# Patient Record
Sex: FEMALE | Race: WHITE | Employment: UNEMPLOYED
[De-identification: names, ages, dates, MRNs, and addresses within clinical notes are randomized per-mention and may not be internally consistent; named-entity substitution may affect disease eponyms.]

---

## 2017-09-17 ENCOUNTER — HEALTH MAINTENANCE LETTER (OUTPATIENT)
Age: 62
End: 2017-09-17

## 2018-01-21 ENCOUNTER — APPOINTMENT (OUTPATIENT)
Dept: CT IMAGING | Facility: CLINIC | Age: 63
End: 2018-01-21
Attending: EMERGENCY MEDICINE
Payer: MEDICAID

## 2018-01-21 ENCOUNTER — HOSPITAL ENCOUNTER (EMERGENCY)
Facility: CLINIC | Age: 63
Discharge: HOME | End: 2018-01-21
Attending: EMERGENCY MEDICINE | Admitting: EMERGENCY MEDICINE
Payer: MEDICAID

## 2018-01-21 VITALS
OXYGEN SATURATION: 98 % | HEART RATE: 92 BPM | SYSTOLIC BLOOD PRESSURE: 106 MMHG | DIASTOLIC BLOOD PRESSURE: 72 MMHG | TEMPERATURE: 96.7 F | RESPIRATION RATE: 16 BRPM

## 2018-01-21 DIAGNOSIS — F10.920 ALCOHOLIC INTOXICATION WITHOUT COMPLICATION (H): ICD-10-CM

## 2018-01-21 LAB — ALCOHOL BREATH TEST: 0.23 (ref 0–0.01)

## 2018-01-21 PROCEDURE — 99284 EMERGENCY DEPT VISIT MOD MDM: CPT | Mod: 25

## 2018-01-21 PROCEDURE — 70450 CT HEAD/BRAIN W/O DYE: CPT

## 2018-01-21 ASSESSMENT — ENCOUNTER SYMPTOMS
CONFUSION: 0
WEAKNESS: 0
FEVER: 0
VOMITING: 0
SHORTNESS OF BREATH: 0
ABDOMINAL PAIN: 0
HEADACHES: 0

## 2018-01-21 NOTE — ED AVS SNAPSHOT
[facility]    [address] [place] [address]    Phone:  [phone]    Fax:  [phone]                                       Tamika Vargas   MRN: [id number]    Department:  [facility]   Date of Visit:  1/21/2018           Patient Information     Date Of Birth          1955        Your diagnoses for this visit were:     Alcoholic intoxication without complication (H)        You were seen by Kristal Reilly MD and Clemente Hart MD.      Follow-up Information     Call Post, ELIJAH Abernathy MD.    Specialty:  Family Practice    Why:  As needed    Contact information:    [address] [facility] [address] [phone]          Discharge Instructions         Alcohol Intoxication  Alcohol intoxication occurs when you drink alcohol faster than your liver can remove it from your system. The following facts are important to remember:    It can take 10 minutes or more to start to feel the effects of a drink, so you can easily get more intoxicated than you intended.    One drink may be more than 1 serving of alcohol. Depending on the drink, it can be 2 to 4 servings.    It takes about an hour for your body to metabolize (clear) 1 serving. If you have more than 1 drink, it can take a couple of hours or more.    Many things affect how drinks will affect you, including whether you ve eaten, how fast you drink, your size, how much you normally drink (or not), medicines you take, chronic diseases you have, and gender.  Signs and symptoms of alcohol poisoning  The following are signs and symptoms of alcohol poisoning:  Mild impairment    Reduced inhibitions    Slurred speech    Drowsiness    Decreased fine motor skills  Moderate impairment    Erratic behavior, aggression, depression    Impaired judgment    Confusion    Concentration difficulties    Coordination problems  Severe  "impairment    Vomiting    Seizures    Unconsciousness    Cold, clammy    Slow or irregular breathing    Hypothermia (low body temperature)    Coma  Health effects  Alcohol abuse causes health problems. Sometimes this can happen after only drinking a  little.\" There is no set number of drinks or amount of alcohol that defines too much. The more you drink at one time, and the more frequently you drink determine both the short-term and long-term health effects. It affects all parts of your body and your health, including your:    Brain. Alcohol is a central nervous system depressant. It can damage parts of the brain that affect your balance, memory, thinking, and emotions. It can cause memory loss, blackouts, depression, agitation, sleep cycle changes, and seizures. These changes may or may not be reversible.    Heart and vascular system. Alcohol affects multiple areas. It can damage heart muscle causing cardiomyopathy, which is a weakening and stretching of the heart muscle. This can lead to trouble breathing, an irregular heartbeat, atrial fibrillation, leg swelling, and heart failure. It makes the blood vessels stiffen causing hypertension (high blood pressure). All of these problems increase your risk of having heart attacks or strokes.    Liver. Alcohol causes fat to build up in the liver, affecting its normal function. This increases the risk for hepatitis, leading to abdominal pain, appetite loss, jaundice, bleeding problems, liver fibrosis, and cirrhosis. This in turn can affect your ability to fight off infections, and can cause diabetes. The liver changes prevent it from removing toxins in your blood that can cause encephalopathy. Signs of this are confusion, altered level of consciousness, personality changes, memory loss, seizures, coma, and death.    Pancreas. Alcohol can cause inflammation of the pancreas, or pancreatitis. This can cause pain in your abdomen, fever, and diabetes.    Immune system. Alcohol " weakens your immune system in a number of ways. It suppresses your immune system making it harder to fight off infections and colds. You will also have a higher risk of certain infections like pneumonia and tuberculosis.    Cancer risk. Alcohol raises your risk of cancer of the mouth, esophagus, pharynx, larynx, liver, and breast.    Sexual function. Alcohol abuse can also lead to sexual problems.  Alcohol use during pregnancy may cause permanent damage to the growing baby.  Home care  The following guidelines will help you care for yourself at home:    Don't drink any more alcohol.    Don't drive until all effects of the alcohol have worn off.    Don't operate machinery that can cause injuries.    Get lots of rest over the next few days. Drink plenty of water and other non-alcoholic liquids. Try to eat regular meals.    If you have been drinking heavily on a daily basis, you may go through alcohol withdrawal. The usual symptoms last 3 to 4 days and may include nervousness, shakiness, nausea, sweating, sleeplessness, and can even cause seizures and a serious withdrawal symptom called delirium tremens, or DTs. During this time, it is best that you stay with family or friends who can help and support you. You can also admit yourself to a residential detox program. If your symptoms are severe (seizures, severe shakiness, confusion), contact your doctor or call an ambulance for help (see below).   Follow-up care  If alcohol is a problem in your life, these are some organizations that can help you:    Alcoholics Anonymous offers support through a self-help fellowship. There are no dues or fees. See the Yellow Pages and call for time and place of meetings. Find AA online at www.aa.org.    [employer] offers support to families of alcohol users. Contact 710-752-1166, or online at www.al-anoheather.org.    National [place] on Alcoholism and Drug Dependence can be reached at 797-239-9731, or online at www.ncadd.org.    There are also  inpatient and residential alcohol detox programs. Check the Internet or phonebook Yellow Pages under  Drug Abuse and Treatment Centers.   Call 911  Call 911 if any of these occur:    Trouble breathing or slow irregular breathing    Chest pain    Sudden weakness on one side of your body or sudden trouble speaking    Heavy bleeding or vomiting blood    Very drowsy or trouble awakening    Fainting or loss of consciousness    Rapid heart rate    Seizure  When to seek medical advice  Call your healthcare provider right away if any of these occur:    Severe shakiness     Fever over 100.4  F (38.0  C)    Confusion or hallucinations (seeing, hearing, or feeling things that are not there)    Pain in your upper abdomen that gets worse    Repeated vomiting  Date Last Reviewed: 6/1/2016 2000-2017 [employer]. [address] [place] [address], [place] [address]. All rights reserved. This information is not intended as a substitute for professional medical care. Always follow your healthcare professional's instructions.          24 Hour Appointment Hotline       To make an appointment at any [facility], call [phone] ([phone]). If you don't have a family doctor or clinic, we will help you find one. [facility] clinics are conveniently located to serve the needs of you and your family.             Review of your medicines      Our records show that you are taking the medicines listed below. If these are incorrect, please call your family doctor or clinic.        Dose / Directions Last dose taken    IBUPROFEN PO   Dose:  400 mg        Take 400 mg by mouth   Refills:  0                Procedures and tests performed during your visit     Alcohol breath test POCT    Head CT w/o contrast      Orders Needing Specimen Collection     None      Pending Results     No orders found from 1/19/2018 to 1/22/2018.            Pending Culture Results     No orders found from 1/19/2018 to 1/22/2018.            Pending Results  Instructions     If you had any lab results that were not finalized at the time of your Discharge, you can call the ED Lab Result RN at [phone]. You will be contacted by this team for any positive Lab results or changes in treatment. The nurses are available 7 days a week from 10A to 6:30P.  You can leave a message 24 hours per day and they will return your call.        Test Results From Your Hospital Stay        1/21/2018  2:51 AM      Component Results     Component Value Ref Range & Units Status    Alcohol Breath Test 0.23 (A) 0.00 - 0.01 Final         1/21/2018  6:18 AM      Narrative     CT SCAN OF THE HEAD WITHOUT CONTRAST   1/21/2018 3:15 AM     HISTORY: Intoxicated, found outside on the ground, evaluate for bleed.      TECHNIQUE:  Axial images of the head and coronal reformations without  IV contrast material. Radiation dose for this scan was reduced using  automated exposure control, adjustment of the mA and/or kV according  to patient size, or iterative reconstruction technique.    COMPARISON: None.    FINDINGS:  The ventricles are normal in size, shape and configuration.   The brain parenchyma and subarachnoid spaces are normal. There is no  evidence of intracranial hemorrhage, mass, acute infarct or anomaly.     The visualized portions of the sinuses and mastoids appear normal.  There is no evidence of trauma.        Impression     IMPRESSION: No acute abnormality.      ANIKA ZHOU MD                Clinical Quality Measure: Blood Pressure Screening     Your blood pressure was checked while you were in the emergency department today. The last reading we obtained was  BP: 106/72 . Please read the guidelines below about what these numbers mean and what you should do about them.  If your systolic blood pressure (the top number) is less than 120 and your diastolic blood pressure (the bottom number) is less than 80, then your blood pressure is normal. There is nothing more that you need to do about  "it.  If your systolic blood pressure (the top number) is 120-139 or your diastolic blood pressure (the bottom number) is 80-89, your blood pressure may be higher than it should be. You should have your blood pressure rechecked within a year by a primary care provider.  If your systolic blood pressure (the top number) is 140 or greater or your diastolic blood pressure (the bottom number) is 90 or greater, you may have high blood pressure. High blood pressure is treatable, but if left untreated over time it can put you at risk for heart attack, stroke, or kidney failure. You should have your blood pressure rechecked by a primary care provider within the next 4 weeks.  If your provider in the emergency department today gave you specific instructions to follow-up with your doctor or provider even sooner than that, you should follow that instruction and not wait for up to 4 weeks for your follow-up visit.        Thank you for choosing [facility]       Thank you for choosing [facility] for your care. Our goal is always to provide you with excellent care. Hearing back from our patients is one way we can continue to improve our services. Please take a few minutes to complete the written survey that you may receive in the mail after you visit with us. Thank you!        [employer] Information     [employer] lets you send messages to your doctor, view your test results, renew your prescriptions, schedule appointments and more. To sign up, go to [email or web address] . Click on \"Log in\" on the left side of the screen, which will take you to the Welcome page. Then click on \"Sign up Now\" on the right side of the page.     You will be asked to enter the access code listed below, as well as some personal information. Please follow the directions to create your username and password.     Your access code is: [email or web address]  Expires: 2018  8:00 AM     Your access code will  in 90 days. If you need help or a new code, please call " your [facility] clinic or [phone].        Care EveryWhere ID     This is your Care EveryWhere ID. This could be used by other organizations to access your [facility] medical records  [id number]        Equal Access to Services     VADIM GALICIA: David Marley, wachristinada luqadaha, qaybta kaalmada lior, asim galicia. So [facility] [phone].    ATENCIÓN: Si habla español, tiene a hernandez disposición servicios gratuitos de asistencia lingüística. Llame al [phone].    We comply with applicable federal civil rights laws and [place] laws. We do not discriminate on the basis of race, color, national origin, age, disability, sex, sexual orientation, or gender identity.            After Visit Summary       This is your record. Keep this with you and show to your community pharmacist(s) and doctor(s) at your next visit.

## 2018-01-21 NOTE — DISCHARGE INSTRUCTIONS
"  Alcohol Intoxication  Alcohol intoxication occurs when you drink alcohol faster than your liver can remove it from your system. The following facts are important to remember:    It can take 10 minutes or more to start to feel the effects of a drink, so you can easily get more intoxicated than you intended.    One drink may be more than 1 serving of alcohol. Depending on the drink, it can be 2 to 4 servings.    It takes about an hour for your body to metabolize (clear) 1 serving. If you have more than 1 drink, it can take a couple of hours or more.    Many things affect how drinks will affect you, including whether you ve eaten, how fast you drink, your size, how much you normally drink (or not), medicines you take, chronic diseases you have, and gender.  Signs and symptoms of alcohol poisoning  The following are signs and symptoms of alcohol poisoning:  Mild impairment    Reduced inhibitions    Slurred speech    Drowsiness    Decreased fine motor skills  Moderate impairment    Erratic behavior, aggression, depression    Impaired judgment    Confusion    Concentration difficulties    Coordination problems  Severe impairment    Vomiting    Seizures    Unconsciousness    Cold, clammy    Slow or irregular breathing    Hypothermia (low body temperature)    Coma  Health effects  Alcohol abuse causes health problems. Sometimes this can happen after only drinking a  little.\" There is no set number of drinks or amount of alcohol that defines too much. The more you drink at one time, and the more frequently you drink determine both the short-term and long-term health effects. It affects all parts of your body and your health, including your:    Brain. Alcohol is a central nervous system depressant. It can damage parts of the brain that affect your balance, memory, thinking, and emotions. It can cause memory loss, blackouts, depression, agitation, sleep cycle changes, and seizures. These changes may or may not be " reversible.    Heart and vascular system. Alcohol affects multiple areas. It can damage heart muscle causing cardiomyopathy, which is a weakening and stretching of the heart muscle. This can lead to trouble breathing, an irregular heartbeat, atrial fibrillation, leg swelling, and heart failure. It makes the blood vessels stiffen causing hypertension (high blood pressure). All of these problems increase your risk of having heart attacks or strokes.    Liver. Alcohol causes fat to build up in the liver, affecting its normal function. This increases the risk for hepatitis, leading to abdominal pain, appetite loss, jaundice, bleeding problems, liver fibrosis, and cirrhosis. This in turn can affect your ability to fight off infections, and can cause diabetes. The liver changes prevent it from removing toxins in your blood that can cause encephalopathy. Signs of this are confusion, altered level of consciousness, personality changes, memory loss, seizures, coma, and death.    Pancreas. Alcohol can cause inflammation of the pancreas, or pancreatitis. This can cause pain in your abdomen, fever, and diabetes.    Immune system. Alcohol weakens your immune system in a number of ways. It suppresses your immune system making it harder to fight off infections and colds. You will also have a higher risk of certain infections like pneumonia and tuberculosis.    Cancer risk. Alcohol raises your risk of cancer of the mouth, esophagus, pharynx, larynx, liver, and breast.    Sexual function. Alcohol abuse can also lead to sexual problems.  Alcohol use during pregnancy may cause permanent damage to the growing baby.  Home care  The following guidelines will help you care for yourself at home:    Don't drink any more alcohol.    Don't drive until all effects of the alcohol have worn off.    Don't operate machinery that can cause injuries.    Get lots of rest over the next few days. Drink plenty of water and other non-alcoholic liquids.  Try to eat regular meals.    If you have been drinking heavily on a daily basis, you may go through alcohol withdrawal. The usual symptoms last 3 to 4 days and may include nervousness, shakiness, nausea, sweating, sleeplessness, and can even cause seizures and a serious withdrawal symptom called delirium tremens, or DTs. During this time, it is best that you stay with family or friends who can help and support you. You can also admit yourself to a residential detox program. If your symptoms are severe (seizures, severe shakiness, confusion), contact your doctor or call an ambulance for help (see below).   Follow-up care  If alcohol is a problem in your life, these are some organizations that can help you:    Alcoholics Anonymous offers support through a self-help fellowship. There are no dues or fees. See the Yellow Pages and call for time and place of meetings. Find AA online at www.aa.org.    [employer] offers support to families of alcohol users. Contact 548-171-3834, or online at www.al-anoheather.org.    National [place] on Alcoholism and Drug Dependence can be reached at 228-851-6868, or online at www.ncadd.org.    There are also inpatient and residential alcohol detox programs. Check the Internet or phonebook Yellow Pages under  Drug Abuse and Treatment Centers.   Call 911  Call 911 if any of these occur:    Trouble breathing or slow irregular breathing    Chest pain    Sudden weakness on one side of your body or sudden trouble speaking    Heavy bleeding or vomiting blood    Very drowsy or trouble awakening    Fainting or loss of consciousness    Rapid heart rate    Seizure  When to seek medical advice  Call your healthcare provider right away if any of these occur:    Severe shakiness     Fever over 100.4  F (38.0  C)    Confusion or hallucinations (seeing, hearing, or feeling things that are not there)    Pain in your upper abdomen that gets worse    Repeated vomiting  Date Last Reviewed: 6/1/2016 2000-2017 [employer]. [address] [place] [address], [place] [address]. All rights reserved. This information is not intended as a substitute for professional medical care. Always follow your healthcare professional's instructions.

## 2018-01-21 NOTE — ED NOTES
Pt daughter called 911 after being unable to get ahold of pt and concerns of ETOH abuse. PD arrived to find pt laying on the ground, in the snow outside. She stated she had been drinking and decided to just lay outside for fresh air. Denies injury, skin warm and dry with no obvious injuries.

## 2018-01-21 NOTE — ED AVS SNAPSHOT
[facility] Emergency Department    [address]    [facility] [address]    Phone:  [phone]    Fax:  [phone]                                       Tamika Vargas   MRN: [id number]    Department:  [facility] Emergency Department   Date of Visit:  1/21/2018           After Visit Summary Signature Page     I have received my discharge instructions, and my questions have been answered. I have discussed any challenges I see with this plan with the nurse or doctor.    ..........................................................................................................................................  Patient/Patient Representative Signature      ..........................................................................................................................................  Patient Representative Print Name and Relationship to Patient    ..................................................               ................................................  Date                                            Time    ..........................................................................................................................................  Reviewed by Signature/Title    ...................................................              ..............................................  Date                                                            Time

## 2018-07-04 ENCOUNTER — APPOINTMENT (OUTPATIENT)
Dept: GENERAL RADIOLOGY | Facility: CLINIC | Age: 63
End: 2018-07-04
Attending: EMERGENCY MEDICINE
Payer: COMMERCIAL

## 2018-07-04 ENCOUNTER — HOSPITAL ENCOUNTER (EMERGENCY)
Facility: CLINIC | Age: 63
Discharge: HOME | End: 2018-07-05
Attending: EMERGENCY MEDICINE | Admitting: EMERGENCY MEDICINE
Payer: COMMERCIAL

## 2018-07-04 VITALS
DIASTOLIC BLOOD PRESSURE: 64 MMHG | OXYGEN SATURATION: 99 % | HEART RATE: 78 BPM | SYSTOLIC BLOOD PRESSURE: 125 MMHG | RESPIRATION RATE: 16 BRPM | TEMPERATURE: 98.5 F | HEIGHT: 61 IN

## 2018-07-04 DIAGNOSIS — F10.920 ALCOHOLIC INTOXICATION WITHOUT COMPLICATION (H): ICD-10-CM

## 2018-07-04 DIAGNOSIS — S89.91XA KNEE INJURY, RIGHT, INITIAL ENCOUNTER: ICD-10-CM

## 2018-07-04 LAB — ALCOHOL BREATH TEST: 0.3 (ref 0–0.01)

## 2018-07-04 PROCEDURE — 73562 X-RAY EXAM OF KNEE 3: CPT | Mod: RT

## 2018-07-04 PROCEDURE — 82075 ASSAY OF BREATH ETHANOL: CPT

## 2018-07-04 PROCEDURE — 99284 EMERGENCY DEPT VISIT MOD MDM: CPT

## 2018-07-04 ASSESSMENT — ENCOUNTER SYMPTOMS: WOUND: 1

## 2018-07-04 NOTE — ED AVS SNAPSHOT
[facility] Emergency Department    [address]    [facility] [address]    Phone:  [phone]    Fax:  [phone]                                       Tamika Vargas   MRN: [id number]    Department:  [facility] Emergency Department   Date of Visit:  7/4/2018           After Visit Summary Signature Page     I have received my discharge instructions, and my questions have been answered. I have discussed any challenges I see with this plan with the nurse or doctor.    ..........................................................................................................................................  Patient/Patient Representative Signature      ..........................................................................................................................................  Patient Representative Print Name and Relationship to Patient    ..................................................               ................................................  Date                                            Time    ..........................................................................................................................................  Reviewed by Signature/Title    ...................................................              ..............................................  Date                                                            Time

## 2018-07-04 NOTE — ED NOTES
Bed: ED04  Expected date: 7/4/18  Expected time: 6:46 PM  Means of arrival: Ambulance  Comments:  BV2

## 2018-07-05 NOTE — ED PROVIDER NOTES
"  History     Chief Complaint:  Alcohol Intoxication    HPI   Tamika Vargas is a 63 year old female who presents to the emergency department today for evaluation of alcohol intoxication. The patient reports she was walking in the park by herself and tripped and hurt her right knee. She does not think she hit her head or lost consciousness. She denies headache, neck pain, back pain, chest pain, shortness of breath, abdominal pain, left leg pain, arm pain, suicidal thoughts, or thoughts of hurting others. The patient admits to drinking alcohol today, and says she drinks alcohol everyday. She denies other concerns/complaints at this time.     Allergies:  No Known Allergies     Medications:    Ibuprofen    Past Medical History:    Substance abuse    Past Surgical History:     section  Cholecystectomy    Family History:    Mother: heart disease, diabetes, alzheimer disease  Father: heart disease, diabetes  Maternal grandmother: diabetes  Maternal grandfather: diabetes  Paternal grandmother: diabetes  Paternal grandfather: diabetes  Brother: diabetes   Sister: diabetes  Daughter: diabetes    Social History:  Smoking Status: Current Every Day Smoker   Packs/day: 0.5   Type: cigaretts  Smokeless Tobacco: Never Used  Alcohol Use: Positive   Marital Status:  Single      Review of Systems   Constitutional:        Alcohol intoxication   Skin: Positive for wound.   Neurological: Negative for syncope.   Psychiatric/Behavioral: Negative for self-injury and suicidal ideas.   All other systems reviewed and are negative.    Physical Exam     Patient Vitals for the past 24 hrs:   BP Temp Temp src Pulse Heart Rate Resp SpO2 Height   18 2347 125/64 - - 78 - 16 99 % -   18 2210 - - - - - - 96 % -   18 2209 123/78 - - - - - - -   18 1857 117/74 98.5  F (36.9  C) Temporal - 89 20 95 % 1.549 m (5' 1\")        Physical Exam  Constitutional: The patient is oriented to person, place, and time. Alert and " cooperative.  HENT:   Head: atraumatic.   Right Ear: External ear normal. TM normal appearing.   Left Ear: External ear normal. TM normal appearing.   Nose: Nose normal.   Mouth/Throat: Uvula is midline, oropharynx is clear and moist and mucous membranes are normal. No posterior oropharyngeal edema or erythema.   Eyes: Conjunctivae, EOM and lids are normal. Pupils are equal, round, and reactive to light.   Neck: Trachea normal. Normal range of motion. Neck supple.   Cardiovascular: Normal rate, regular rhythm, normal heart sounds, and intact distal pulses.    Pulmonary/Chest: Effort normal and breath sounds equal bilaterally. No crackles or wheezing.   Abdominal: Soft. No tenderness. No rebound and no guarding.   Musculoskeletal: No midline tenderness, step-offs, or deformities in the C/T/L spine.   RLE: no obvious deformity, abrasion over the knee. Tenderness to palpation over the knee. Non-tender to palpation over the greater troch, femur, lower leg, ankle, and foot. Normal ROM at the hip, knee, ankle, and foot. Sensation intact to light touch throughout. 2+ DP and PT pulse.  Neurological: Alert and Oriented. Strength 5/5 in upper and lower extremities bilaterally. Sensation intact to light touch throughout.  Skin: Skin is dry. No rash noted.        Emergency Department Course     Imaging:  Radiology findings were communicated with the patient who voiced understanding of the findings.    XR Knee Right 3 Views   Tiny knee joint effusion. Otherwise negative right knee  x-rays.  Reading per radiology    Laboratory:  Laboratory findings were communicated with the patient who voiced understanding of the findings.    Alcohol breath test POCT: 0.299 (A)    Emergency Department Course:    1909 Nursing notes and vitals reviewed.    1915 I performed an exam of the patient as documented above.     1949 The patient was sent for a knee x-ray while in the emergency department, results above.      2122 The patient underwent a  breathalyzer test here in the emergency department, findings above.     Patient signed out to the oncoming physician, Dr. Jay awaiting discharge with sober ride vs discharge to detox.    Impression & Plan      Medical Decision Making:  Tamika Vargas is a 63 year old female who presents to the emergency department today for evaluation of acute alcohol intoxication and traumatic right knee pain.  Upon presentation in the ED, patient is nontoxic-appearing and vitals are within normal limits and stable.  On exam, she is well-appearing.  She is alert, oriented, neurologic exam is nonfocal.  Head is atraumatic and without signs of basilar skull fracture.  She has no midline tenderness, step-offs, or deformities in the C/T/L-spine.  Cardiopulmonary exam is unremarkable.  Abdomen is soft nontender throughout.  On exam of the right lower extremity, there is no obvious deformity.  There is an abrasion over the knee.  She does endorse tenderness palpation over the knee.  She has good range of motion of the right lower extremity without significant pain.  She is neurovascularly intact.  The rest of her exam is as mentioned above.    Breathalyzer is elevated 0.299.  X-ray of the right knee was obtained and demonstrates a tiny knee joint effusion.  There is no other acute abnormality noted.  These results were discussed with the patient she notes understanding.  Her head to toe trauma exam is otherwise unremarkable.  The patient denies any other specific complaints at this time.  The patient has no findings on exam to suggest alcohol withdrawal.  There is no indication for evaluation by DEC at this time. She denies SI/HI. Given that the patient is well-appearing, I do feel she is safe for discharge.  However, given that she is acutely intoxicated, I did discuss with the patient that she will need to find either a sober ride to home or she will be discharged to detox.  The patient was given a trial of ambulation and  tolerated this well without difficulty.  She does have a steady gait.  The patient notes that she will attempt to obtain a sober ride home.  The patient was then signed out to my colleague, Dr. Jay, pending discharge with sober ride versus discharge to detox.  She was stable/improved at time of signout.    Diagnosis:    ICD-10-CM    1. Alcoholic intoxication without complication (H) F10.920    2. Knee injury, right, initial encounter S89.91XA      Disposition:   Signed out to Dr. Jay    Scribe Disclosure:  I, Moriah Iraheta, am serving as a scribe at 7:10 PM on 7/4/2018 to document services personally performed by Karen Fitzgerald MD based on my observations and the provider's statements to me.     [facility] EMERGENCY DEPARTMENT       Karen Fitzgerald MD  07/05/18 0023

## 2018-07-05 NOTE — ED TRIAGE NOTES
Patient states she was walking in the park and tripped injurying her right knee.  Patient called 911.  Small abrasion noted to right knee.  Patient smells strongly of alcohol and blew a 0.3 for EMS.  Patient alert and oriented x3.  Airway, breathing and circulation intact.

## 2018-07-05 NOTE — DISCHARGE INSTRUCTIONS
Please follow up closely with your regular physician. Please return to the ED if your symptoms worsen or if you develop new or concerning symptoms.     Discharge Instructions  Alcohol Intoxication    You have been seen today with alcohol intoxication. This means that you have enough alcohol in your system to impair your ability to mentally and physically function. When you are intoxicated, we are not allowed to release you without a sober adult to be with you. You may not drive, operate dangerous equipment, or do anything else dangerous until you are sober.    You may have come to the Emergency Department because of your intoxication, or for another reason, such as because of an injury. No matter what the case is, this visit is a  red flag  regarding alcohol use, and you should consider whether your drinking pattern is a problem for you.     You may be at risk for alcohol-related problems if:      Men: you drink more than 14 drinks per week, or more than 4 drinks per occasion.      Women: you drink more than 7 drinks per week or more than 3 drinks per occasion.      You have black-outs.    You do things you regret while drinking.    You have legal problems because of drinking (DUI).    You have job problems because of drinking (you call in sick to work because of drinking).    CAGE Questions    Have you ever felt you should cut down on your drinking?    Have people annoyed you by criticizing your drinking?    Have you ever felt bad or guilty about your drinking?    Have you ever had a drink first thing in the morning to steady your nerves or get rid of a hangover (eye opener)?    If you answer yes to any of the CAGE questions, you may have a problem with alcohol.      Return to the Emergency Department if:    You become shaky or tremble when you try to stop drinking.      You have a seizure or pass out.      You throw up (vomit) blood. This may be bright red or it may look like black coffee grounds.      You have  blood in the stool. This may be bright red or appear as a black, tarry, bad smelling stool.      You become lightheaded or faint.      For further help, contact:     Your caregiver.      Alcoholics Anonymous (AA).      A drug or alcohol rehabilitation program.      You can get information on alcohol resources and groups by calling the number 211 or 1-946.728.3653 on any phone.       Seek medical care if:    You have persistent vomiting.      You have persistent pain in any part of your body.      You do not feel better after a few days.    If you were given a prescription for medicine here today, be sure to read all of the information (including the package insert) that comes with your prescription.  This will include important information about the medicine, its side effects, and any warnings that you need to know about.  The pharmacist who fills the prescription can provide more information and answer questions you may have about the medicine.  If you have questions or concerns that the pharmacist cannot address, please call or return to the Emergency Department.   Remember that you can always come back to the Emergency Department if you are not able to see your regular doctor in the amount of time listed above, if you get any new symptoms, or if there is anything that worries you.

## 2018-07-05 NOTE — ED NOTES
Pt reports she tripped and fell in the park today and called 911. Pt denies head strike, admits to drinking today

## 2021-10-28 NOTE — ED PROVIDER NOTES
History     Chief Complaint:  Alcohol Intoxication      HPI   Tamika Vargas is a 62 year old female who presents with alcohol intoxication.  Patient states she walked to the liquor store and bought a bottle of vodka.  When she walked back home the door was locked and she couldn't get in (patient didn't have a key).  Patient states she sat on the doorstep outside waiting for someone to let her in and drank more than a 1/5 of vodka.  She denies falling or head trauma.  The next thing she remembers is waking up to police shaking her.  She was brought here for evaluation.      I spoke with the patient's daughter Jennifer over the phone.  She is a nurse at [employer].  She could not find her mom this evening went she went out to work and notified the police.  Police found her laying on the ground outside the house and woke up up and brought her here.     Allergies:  No known drug allergies     Medications:    Ibuprofen      Past Medical History:    Alcoholism   Anxiety  Moderate major depression  Osteoarthritis of hand    Past Surgical History:      Cholecystectomy  Endometrial surgery     Family History:    Heart disease, diabetes    Social History:  Smoking Status: current Smoker  Alcohol Use: Yes.   Marital Status:  Single      Review of Systems   Constitutional: Negative for fever.   Respiratory: Negative for shortness of breath.    Cardiovascular: Negative for chest pain.   Gastrointestinal: Negative for abdominal pain and vomiting.   Neurological: Negative for weakness and headaches.   Psychiatric/Behavioral: Negative for confusion, self-injury and suicidal ideas.   All other systems reviewed and are negative.        Physical Exam   First Vitals:  BP: 135/81  Pulse: 92  Heart Rate: 92  Temp: 96.7  F (35.9  C)  Resp: 16  SpO2: 100 %      Physical Exam  Constitutional: Alert, attentive, GCS 14 (E4, V4, M6), middle aged woman laying in bed, eyes open, interactive, poor historian   HENT:    Nose: Nose  normal.    Mouth/Throat: Oropharynx is clear, mucous membranes are moist   Eyes: Normal conjunctiva. Pupils are equal, round, and reactive to light.   Neck: no midline cervical spine tenderness to palpation, supple, full range of motion   CV: regular rate and rhythm; no murmurs, rubs or gallups  Chest: Effort normal and breath sounds normal.   GI:  There is no tenderness with deep palpation. No distension. Normal bowel sounds  MSK: Normal range of motion.   Neurological: Alert, attentive, oriented x4, slightly slurred speech, strength intact   Skin: Skin is warm and dry.  Psych: laughing, labile affect, denies SI/HI, appears intoxicated       Emergency Department Course     Imaging:    CT-scan Head w/o contrast:  No acute abnormality.   Result per radiology.      Laboratory:  0251 - ETOH: 0.23    Emergency Department Course:  The patient arrived in the emergency department via EMS.   Past medical records, nursing notes, and vitals reviewed.   I performed an exam of the patient and obtained history, as documented above.      I spoke with the daughter, Jennifer, via cell phone.  Her number is [phone].    The patient was sent for a CT-scan while in the emergency department, findings above.     0612 I left a phone message for her daughter Jennifer again to update her on CT results.  Patient is sleeping comfortable.  She was woken up and mental status appears normal.        Impression & Plan      Medical Decision Making:  Tamika Vargas is a 62 year old female who presents with alcohol intoxication and found unconscious outside of her house in the cold.  A broad differential diagnosis was considered including intracranial hemorrhage, head trauma, alcohol intoxication, drug intoxication, hypoglycemia, pancreatitis, suicidal ideation, depression.  I was able to speak with the daughter, Reema, over the phone he was able to provide further history.  There is no evidence of intracranial hemorrhage on CT.  This was obtained as  patient was found unconscious she is high risk for subdural given her alcohol abuse.  There is no history of trauma, and the patient for physical exam does not reveal any traumatic injuries she is not having other symptoms such as shortness of breath, hypoxia, abdominal pain.  There is no evidence of frostbrite.  She was kept in the emergency department due to her alcohol intoxication (breath level 0.2) and lack of sober ride.  Plan is for patient to be picked up in the morning around 7:45 AM after her daughter finishing up her evening shift at [employer].  This plan was discussed with the patient.    Patient signed out to my colleauge, Dr. Hart waiting for ride.      Diagnosis:    ICD-10-CM    1. Alcoholic intoxication without complication (H) F10.920        Raffaele Crouch  1/21/2018   [facility] EMERGENCY DEPARTMENT  Raffaele GUTIÉRREZ, am serving as a scribe at 2:26 AM on 1/21/2018 to document services personally performed by Kristal Reilly MD based on my observations and the provider's statements to me.       Kristal Reilly MD  01/21/18 0615       Kristal Reilly MD  01/21/18 0632     no